# Patient Record
Sex: FEMALE | Race: BLACK OR AFRICAN AMERICAN | NOT HISPANIC OR LATINO | Employment: FULL TIME | URBAN - METROPOLITAN AREA
[De-identification: names, ages, dates, MRNs, and addresses within clinical notes are randomized per-mention and may not be internally consistent; named-entity substitution may affect disease eponyms.]

---

## 2019-02-21 ENCOUNTER — LAB (OUTPATIENT)
Dept: OTHER | Facility: HOSPITAL | Age: 39
End: 2019-02-21

## 2019-02-21 ENCOUNTER — CONSULT (OUTPATIENT)
Dept: ONCOLOGY | Facility: CLINIC | Age: 39
End: 2019-02-21

## 2019-02-21 ENCOUNTER — TELEPHONE (OUTPATIENT)
Dept: GENERAL RADIOLOGY | Facility: HOSPITAL | Age: 39
End: 2019-02-21

## 2019-02-21 VITALS — HEIGHT: 65 IN | BODY MASS INDEX: 31.39 KG/M2 | WEIGHT: 188.4 LBS

## 2019-02-21 DIAGNOSIS — R01.1 MURMUR, CARDIAC: ICD-10-CM

## 2019-02-21 DIAGNOSIS — D64.9 ANEMIA, UNSPECIFIED TYPE: Primary | ICD-10-CM

## 2019-02-21 DIAGNOSIS — R79.89 ELEVATED PLATELET COUNT: Primary | ICD-10-CM

## 2019-02-21 DIAGNOSIS — R06.89 DECREASED BREATH SOUNDS AT RIGHT LUNG BASE: ICD-10-CM

## 2019-02-21 LAB
BASOPHILS # BLD AUTO: 0.04 10*3/MM3 (ref 0–0.2)
BASOPHILS NFR BLD AUTO: 0.7 % (ref 0–1.5)
DEPRECATED RDW RBC AUTO: 43.6 FL (ref 37–54)
EOSINOPHIL # BLD AUTO: 0.12 10*3/MM3 (ref 0–0.4)
EOSINOPHIL NFR BLD AUTO: 2.1 % (ref 0.3–6.2)
ERYTHROCYTE [DISTWIDTH] IN BLOOD BY AUTOMATED COUNT: 13.9 % (ref 12.3–15.4)
FERRITIN SERPL-MCNC: 9.3 NG/ML (ref 13–150)
HCT VFR BLD AUTO: 33.7 % (ref 34–46.6)
HGB BLD-MCNC: 10.8 G/DL (ref 12–15.9)
IMM GRANULOCYTES # BLD AUTO: 0.03 10*3/MM3 (ref 0–0.05)
IMM GRANULOCYTES NFR BLD AUTO: 0.5 % (ref 0–0.5)
IRON 24H UR-MRATE: 21 MCG/DL (ref 37–145)
IRON SATN MFR SERPL: 5 % (ref 20–50)
LYMPHOCYTES # BLD AUTO: 2.54 10*3/MM3 (ref 0.7–3.1)
LYMPHOCYTES NFR BLD AUTO: 44.9 % (ref 19.6–45.3)
MCH RBC QN AUTO: 27.6 PG (ref 26.6–33)
MCHC RBC AUTO-ENTMCNC: 32 G/DL (ref 31.5–35.7)
MCV RBC AUTO: 86 FL (ref 79–97)
MONOCYTES # BLD AUTO: 0.46 10*3/MM3 (ref 0.1–0.9)
MONOCYTES NFR BLD AUTO: 8.1 % (ref 5–12)
NEUTROPHILS # BLD AUTO: 2.47 10*3/MM3 (ref 1.4–7)
NEUTROPHILS NFR BLD AUTO: 43.7 % (ref 42.7–76)
NRBC BLD AUTO-RTO: 0 /100 WBC (ref 0–0)
PLATELET # BLD AUTO: 449 10*3/MM3 (ref 140–450)
PMV BLD AUTO: 9.1 FL (ref 6–12)
RBC # BLD AUTO: 3.92 10*6/MM3 (ref 3.77–5.28)
TIBC SERPL-MCNC: 417 MCG/DL (ref 298–536)
TRANSFERRIN SERPL-MCNC: 280 MG/DL (ref 200–360)
WBC NRBC COR # BLD: 5.66 10*3/MM3 (ref 3.4–10.8)

## 2019-02-21 PROCEDURE — 83540 ASSAY OF IRON: CPT | Performed by: INTERNAL MEDICINE

## 2019-02-21 PROCEDURE — 85025 COMPLETE CBC W/AUTO DIFF WBC: CPT | Performed by: INTERNAL MEDICINE

## 2019-02-21 PROCEDURE — 84466 ASSAY OF TRANSFERRIN: CPT | Performed by: INTERNAL MEDICINE

## 2019-02-21 PROCEDURE — 99244 OFF/OP CNSLTJ NEW/EST MOD 40: CPT | Performed by: INTERNAL MEDICINE

## 2019-02-21 PROCEDURE — 82728 ASSAY OF FERRITIN: CPT | Performed by: INTERNAL MEDICINE

## 2019-02-21 PROCEDURE — 36415 COLL VENOUS BLD VENIPUNCTURE: CPT

## 2019-02-21 RX ORDER — ONDANSETRON 4 MG/1
4 TABLET, ORALLY DISINTEGRATING ORAL
COMMUNITY
Start: 2019-02-11

## 2019-02-21 RX ORDER — IBUPROFEN 800 MG/1
800 TABLET ORAL
COMMUNITY
Start: 2019-02-11 | End: 2020-02-11

## 2019-02-21 NOTE — PROGRESS NOTES
.     REASON FOR CONSULTATION:     Provide an opinion on any further workup or treatment                             REQUESTING PHYSICIAN: REY Fonseca  RECORDS OBTAINED:  Records of the patients history including those obtained from the referring provider were reviewed and summarized in detail.    HISTORY OF PRESENT ILLNESS:  The patient is a 38 y.o. year old female  who is here for follow-up with the above-mentioned history. In 2018 lab evaluation revealed significant thrombocytosis with a platelet count of 550,000. At that time, patient had recently had a miscarriage. She comes to the office today for further evaluation of this. She is currently in the process of attempting to get pregnant, yet again.     She has been historically anemic and this is the case yet again today with a hemoglobin of 10.8. Her platelets have actually normalized to high-normal at 449,000. Thrombocytosis is a reactive process to iron deficiency anemia. Patient is on a daily prenatal vitamin which she believes has iron in it.     She is feeling well. She denies any fatigue, chest pain, shortness of breath, or bleeding issues. No Pica noted.     She has no other concerns today.         Past Medical History:   Diagnosis Date   • Allergic rhinitis    • Dysmenorrhea    • Eczema    • Elevated platelet count 2019   • History of obesity    • History of prior pregnancies     x2, 1 miscarriage in 2018   • Hypercholesteremia    • Pruritus     After shower     Past Surgical History:   Procedure Laterality Date   •  SECTION  10/19/2008       HEMATOLOGIC/ONCOLOGIC HISTORY:  (History from previous dates can be found in the separate document.)    MEDICATIONS    Current Outpatient Medications:   •  ibuprofen (ADVIL,MOTRIN) 800 MG tablet, Take 800 mg by mouth., Disp: , Rfl:   •  ondansetron ODT (ZOFRAN-ODT) 4 MG disintegrating tablet, Take 4 mg by mouth., Disp: , Rfl:     ALLERGIES:     Allergies   Allergen Reactions   •  "Sulfa Antibiotics Other (See Comments)     uncertain         SOCIAL HISTORY:       Social History     Socioeconomic History   • Marital status:      Spouse name: Not on file   • Number of children: 1   • Years of education: Not on file   • Highest education level: Not on file   Social Needs   • Financial resource strain: Not on file   • Food insecurity - worry: Not on file   • Food insecurity - inability: Not on file   • Transportation needs - medical: Not on file   • Transportation needs - non-medical: Not on file   Occupational History     Employer: [employer]   Tobacco Use   • Smoking status: Never Smoker   • Smokeless tobacco: Never Used   Substance and Sexual Activity   • Alcohol use: No     Frequency: Never   • Drug use: No   • Sexual activity: Not on file   Other Topics Concern   • Not on file   Social History Narrative   • Not on file         FAMILY HISTORY:  Family History   Problem Relation Age of Onset   • Stroke Mother 50   • No Known Problems Sister    • No Known Problems Son    • Lupus Maternal Grandmother    • Early death Sister         House fire       REVIEW OF SYSTEMS:  Review of Systems  Negative except for as mentioned above          Vitals:    02/21/19 0808   Weight: 85.5 kg (188 lb 6.4 oz)   Height: 165 cm (64.96\")   PainSc: 0-No pain     Current Status 2/21/2019   ECOG score 0      PHYSICAL EXAM:      CONSTITUTIONAL:  Vital signs reviewed.  No distress, looks comfortable.  EYES:  Conjunctiva and lids unremarkable.  PERRLA  EARS,NOSE,MOUTH,THROAT:  Ears and nose appear unremarkable.  Lips, teeth, gums appear unremarkable.  RESPIRATORY:  Normal respiratory effort.  Lungs clear to auscultation bilaterally. Right posterior base diminished breath sounds   CARDIOVASCULAR:  Normal S1, S2.  No rubs or gallops. Grade 3/6 systolic murmur noted No significant lower extremity edema.  GASTROINTESTINAL: Abdomen appears unremarkable.  Nontender.  No hepatomegaly.  No splenomegaly.  LYMPHATIC:  No " cervical, supraclavicular, axillary lymphadenopathy.  MUSCULOSKELETAL:  Unremarkable gait and station.  Unremarkable digits/nails.  No cyanosis or clubbing.  SKIN:  Warm.  No rashes.  PSYCHIATRIC:  Normal judgment and insight.  Normal mood and affect.      RECENT LABS:        WBC   Date Value Ref Range Status   02/21/2019 5.66 3.40 - 10.80 10*3/mm3 Final     Hemoglobin   Date Value Ref Range Status   02/21/2019 10.8 (L) 12.0 - 15.9 g/dL Final     Platelets   Date Value Ref Range Status   02/21/2019 449 140 - 450 10*3/mm3 Final       Assessment/Plan   Anemia, unspecified type  - Ferritin  - Iron Profile    Decreased breath sounds at right lung base  - XR Chest PA & Lateral    Murmur, cardiac  - Adult Transthoracic Echo Complete W/ Cont if Necessary Per Protocol    1. Thrombocytosis: 6 month known history of elevation with associated anemia. Iron deficiency at this point until proven otherwise. We will check a ferritin and iron profile today. If negative, then we will pursue evaluation for a clonal process or other secondary causes (Inflammatory Lupus, etc)    2. Anemia: Please see above    3. Decreased breath sounds in right lung base: May be atelectasis but could be an effusion. We will obtain a chest x-ray for assessment. Patient is asymptomatic     4. Cardiac Murmur: Grade 3 systolic auscultated. We will request an echocardiogram     PLAN:  1. Iron studies and ferritin obtained today. I have asked patient to continue on prenatal vitamins. Additionally, she is to begin once daily ferrous sulfate  2. Chest x-ray to be performed within the next 2 weeks  3. Echocardiogram in next 2 weeks  4. I will plan to see patient in one month to review labs and imaging   5. She is to call with any new concerns prior to her next office visit

## 2019-02-21 NOTE — TELEPHONE ENCOUNTER
----- Message from Rosa Mcbride sent at 2/21/2019  9:08 AM EST -----  Regarding: PLEASE OVERBOOK DR LANGE  PLEASE OVERBOOK 2 UNITS DR LANGE ON MON 3-18-19 @ 12:40 PM.    THANKS!!

## 2019-03-06 ENCOUNTER — HOSPITAL ENCOUNTER (OUTPATIENT)
Dept: GENERAL RADIOLOGY | Facility: HOSPITAL | Age: 39
Discharge: HOME | End: 2019-03-06

## 2019-03-06 ENCOUNTER — HOSPITAL ENCOUNTER (OUTPATIENT)
Dept: CARDIOLOGY | Facility: HOSPITAL | Age: 39
Discharge: HOME | End: 2019-03-06
Admitting: INTERNAL MEDICINE

## 2019-03-06 VITALS
DIASTOLIC BLOOD PRESSURE: 62 MMHG | HEIGHT: 64 IN | BODY MASS INDEX: 32.1 KG/M2 | WEIGHT: 188 LBS | HEART RATE: 64 BPM | SYSTOLIC BLOOD PRESSURE: 132 MMHG

## 2019-03-06 DIAGNOSIS — R06.89 DECREASED BREATH SOUNDS AT RIGHT LUNG BASE: ICD-10-CM

## 2019-03-06 DIAGNOSIS — R01.1 MURMUR, CARDIAC: ICD-10-CM

## 2019-03-06 PROCEDURE — 71046 X-RAY EXAM CHEST 2 VIEWS: CPT

## 2019-03-06 PROCEDURE — 93306 TTE W/DOPPLER COMPLETE: CPT | Performed by: INTERNAL MEDICINE

## 2019-03-06 PROCEDURE — 93306 TTE W/DOPPLER COMPLETE: CPT

## 2019-03-07 LAB
ASCENDING AORTA: 2.7 CM
BH CV ECHO MEAS - ACS: 2 CM
BH CV ECHO MEAS - AO MAX PG (FULL): 2.8 MMHG
BH CV ECHO MEAS - AO MAX PG: 9.1 MMHG
BH CV ECHO MEAS - AO MEAN PG (FULL): 1.7 MMHG
BH CV ECHO MEAS - AO MEAN PG: 5.1 MMHG
BH CV ECHO MEAS - AO ROOT AREA: 7.2 CM^2
BH CV ECHO MEAS - AO ROOT DIAM: 3 CM
BH CV ECHO MEAS - AO V2 MAX: 150.7 CM/SEC
BH CV ECHO MEAS - AO V2 MEAN: 105.1 CM/SEC
BH CV ECHO MEAS - AO V2 VTI: 30.4 CM
BH CV ECHO MEAS - ASC AORTA: 2.7 CM
BH CV ECHO MEAS - AVA(I,A): 2.3 CM^2
BH CV ECHO MEAS - AVA(I,D): 2.3 CM^2
BH CV ECHO MEAS - AVA(V,A): 2.3 CM^2
BH CV ECHO MEAS - AVA(V,D): 2.3 CM^2
BH CV ECHO MEAS - EDV(CUBED): 67.5 ML
BH CV ECHO MEAS - EDV(MOD-SP2): 65 ML
BH CV ECHO MEAS - EDV(MOD-SP4): 60 ML
BH CV ECHO MEAS - EDV(TEICH): 73 ML
BH CV ECHO MEAS - EF(CUBED): 82.8 %
BH CV ECHO MEAS - EF(MOD-BP): 61 %
BH CV ECHO MEAS - EF(MOD-SP2): 58.5 %
BH CV ECHO MEAS - EF(MOD-SP4): 61.7 %
BH CV ECHO MEAS - EF(TEICH): 76.1 %
BH CV ECHO MEAS - ESV(CUBED): 11.6 ML
BH CV ECHO MEAS - ESV(MOD-SP2): 27 ML
BH CV ECHO MEAS - ESV(MOD-SP4): 23 ML
BH CV ECHO MEAS - ESV(TEICH): 17.5 ML
BH CV ECHO MEAS - IVS/LVPW: 1
BH CV ECHO MEAS - IVSD: 1 CM
BH CV ECHO MEAS - LAT PEAK E' VEL: 15 CM/SEC
BH CV ECHO MEAS - LV MASS(C)D: 126.9 GRAMS
BH CV ECHO MEAS - LV MAX PG: 6.3 MMHG
BH CV ECHO MEAS - LV MEAN PG: 3.4 MMHG
BH CV ECHO MEAS - LV V1 MAX: 125.1 CM/SEC
BH CV ECHO MEAS - LV V1 MEAN: 86 CM/SEC
BH CV ECHO MEAS - LV V1 VTI: 25.7 CM
BH CV ECHO MEAS - LVIDD: 4.1 CM
BH CV ECHO MEAS - LVIDS: 2.3 CM
BH CV ECHO MEAS - LVLD AP2: 7.8 CM
BH CV ECHO MEAS - LVLD AP4: 7.7 CM
BH CV ECHO MEAS - LVLS AP2: 6.4 CM
BH CV ECHO MEAS - LVLS AP4: 6.6 CM
BH CV ECHO MEAS - LVOT AREA (M): 2.8 CM^2
BH CV ECHO MEAS - LVOT AREA: 2.8 CM^2
BH CV ECHO MEAS - LVOT DIAM: 1.9 CM
BH CV ECHO MEAS - LVPWD: 0.96 CM
BH CV ECHO MEAS - MED PEAK E' VEL: 10 CM/SEC
BH CV ECHO MEAS - MV A DUR: 0.12 SEC
BH CV ECHO MEAS - MV A MAX VEL: 61.1 CM/SEC
BH CV ECHO MEAS - MV DEC SLOPE: 376.1 CM/SEC^2
BH CV ECHO MEAS - MV DEC TIME: 0.22 SEC
BH CV ECHO MEAS - MV E MAX VEL: 79.1 CM/SEC
BH CV ECHO MEAS - MV E/A: 1.3
BH CV ECHO MEAS - MV MAX PG: 3.2 MMHG
BH CV ECHO MEAS - MV MEAN PG: 1.4 MMHG
BH CV ECHO MEAS - MV P1/2T MAX VEL: 82 CM/SEC
BH CV ECHO MEAS - MV P1/2T: 63.8 MSEC
BH CV ECHO MEAS - MV V2 MAX: 89.6 CM/SEC
BH CV ECHO MEAS - MV V2 MEAN: 56.8 CM/SEC
BH CV ECHO MEAS - MV V2 VTI: 24 CM
BH CV ECHO MEAS - MVA P1/2T LCG: 2.7 CM^2
BH CV ECHO MEAS - MVA(P1/2T): 3.4 CM^2
BH CV ECHO MEAS - MVA(VTI): 3 CM^2
BH CV ECHO MEAS - PA MAX PG (FULL): 0.79 MMHG
BH CV ECHO MEAS - PA MAX PG: 4.5 MMHG
BH CV ECHO MEAS - PA V2 MAX: 105.9 CM/SEC
BH CV ECHO MEAS - PULM A REVS DUR: 0.1 SEC
BH CV ECHO MEAS - PULM A REVS VEL: 28.1 CM/SEC
BH CV ECHO MEAS - PULM DIAS VEL: 36.4 CM/SEC
BH CV ECHO MEAS - PULM S/D: 1.1
BH CV ECHO MEAS - PULM SYS VEL: 40.7 CM/SEC
BH CV ECHO MEAS - PVA(V,A): 2.5 CM^2
BH CV ECHO MEAS - PVA(V,D): 2.5 CM^2
BH CV ECHO MEAS - QP/QS: 0.83
BH CV ECHO MEAS - RV MAX PG: 3.7 MMHG
BH CV ECHO MEAS - RV MEAN PG: 2.3 MMHG
BH CV ECHO MEAS - RV V1 MAX: 96 CM/SEC
BH CV ECHO MEAS - RV V1 MEAN: 72.8 CM/SEC
BH CV ECHO MEAS - RV V1 VTI: 21.5 CM
BH CV ECHO MEAS - RVOT AREA: 2.8 CM^2
BH CV ECHO MEAS - RVOT DIAM: 1.9 CM
BH CV ECHO MEAS - SUP REN AO DIAM: 1.8 CM
BH CV ECHO MEAS - SV(AO): 219.5 ML
BH CV ECHO MEAS - SV(CUBED): 55.9 ML
BH CV ECHO MEAS - SV(LVOT): 71 ML
BH CV ECHO MEAS - SV(MOD-SP2): 38 ML
BH CV ECHO MEAS - SV(MOD-SP4): 37 ML
BH CV ECHO MEAS - SV(RVOT): 59.3 ML
BH CV ECHO MEAS - SV(TEICH): 55.5 ML
BH CV ECHO MEAS - TAPSE (>1.6): 1.8 CM2
BH CV ECHO MEAS - TR MAX VEL: 218.5 CM/SEC
BH CV ECHO MEASUREMENTS AVERAGE E/E' RATIO: 6.33
BH CV XLRA - RV BASE: 2.6 CM
BH CV XLRA - TDI S': 9 CM/SEC
LEFT ATRIUM VOLUME INDEX: 18 ML/M2
LV EF 2D ECHO EST: 61 %
MAXIMAL PREDICTED HEART RATE: 184 BPM
SINUS: 2.6 CM
STJ: 2.6 CM
STRESS TARGET HR: 156 BPM

## 2019-03-18 ENCOUNTER — OFFICE VISIT (OUTPATIENT)
Dept: ONCOLOGY | Facility: CLINIC | Age: 39
End: 2019-03-18

## 2019-03-18 ENCOUNTER — LAB (OUTPATIENT)
Dept: OTHER | Facility: HOSPITAL | Age: 39
End: 2019-03-18

## 2019-03-18 VITALS
RESPIRATION RATE: 16 BRPM | SYSTOLIC BLOOD PRESSURE: 128 MMHG | DIASTOLIC BLOOD PRESSURE: 86 MMHG | OXYGEN SATURATION: 100 % | HEART RATE: 83 BPM | BODY MASS INDEX: 32.39 KG/M2 | HEIGHT: 64 IN | TEMPERATURE: 97.9 F | WEIGHT: 189.7 LBS

## 2019-03-18 DIAGNOSIS — D64.9 ANEMIA, UNSPECIFIED TYPE: Primary | ICD-10-CM

## 2019-03-18 PROBLEM — R79.89 ELEVATED PLATELET COUNT: Status: ACTIVE | Noted: 2019-01-01

## 2019-03-18 LAB
BASOPHILS # BLD AUTO: 0.05 10*3/MM3 (ref 0–0.2)
BASOPHILS NFR BLD AUTO: 0.8 % (ref 0–1.5)
DEPRECATED RDW RBC AUTO: 43.2 FL (ref 37–54)
EOSINOPHIL # BLD AUTO: 0.17 10*3/MM3 (ref 0–0.4)
EOSINOPHIL NFR BLD AUTO: 2.9 % (ref 0.3–6.2)
ERYTHROCYTE [DISTWIDTH] IN BLOOD BY AUTOMATED COUNT: 14 % (ref 12.3–15.4)
HCT VFR BLD AUTO: 33.8 % (ref 34–46.6)
HGB BLD-MCNC: 10.7 G/DL (ref 12–15.9)
IMM GRANULOCYTES # BLD AUTO: 0.01 10*3/MM3 (ref 0–0.05)
IMM GRANULOCYTES NFR BLD AUTO: 0.2 % (ref 0–0.5)
LYMPHOCYTES # BLD AUTO: 2.94 10*3/MM3 (ref 0.7–3.1)
LYMPHOCYTES NFR BLD AUTO: 49.7 % (ref 19.6–45.3)
MCH RBC QN AUTO: 26.8 PG (ref 26.6–33)
MCHC RBC AUTO-ENTMCNC: 31.7 G/DL (ref 31.5–35.7)
MCV RBC AUTO: 84.7 FL (ref 79–97)
MONOCYTES # BLD AUTO: 0.67 10*3/MM3 (ref 0.1–0.9)
MONOCYTES NFR BLD AUTO: 11.3 % (ref 5–12)
NEUTROPHILS # BLD AUTO: 2.08 10*3/MM3 (ref 1.4–7)
NEUTROPHILS NFR BLD AUTO: 35.1 % (ref 42.7–76)
NRBC BLD AUTO-RTO: 0 /100 WBC (ref 0–0)
PLATELET # BLD AUTO: 550 10*3/MM3 (ref 140–450)
PMV BLD AUTO: 8.5 FL (ref 6–12)
RBC # BLD AUTO: 3.99 10*6/MM3 (ref 3.77–5.28)
WBC NRBC COR # BLD: 5.92 10*3/MM3 (ref 3.4–10.8)

## 2019-03-18 PROCEDURE — 36415 COLL VENOUS BLD VENIPUNCTURE: CPT

## 2019-03-18 PROCEDURE — 85025 COMPLETE CBC W/AUTO DIFF WBC: CPT | Performed by: INTERNAL MEDICINE

## 2019-03-18 PROCEDURE — 99214 OFFICE O/P EST MOD 30 MIN: CPT | Performed by: INTERNAL MEDICINE

## 2019-04-29 NOTE — PROGRESS NOTES
.     REASON FOR FOLLOW UP:   Anemia    Murmur   Decreased breath sounds                             REQUESTING PHYSICIAN: REY Fonseca  RECORDS OBTAINED:  Records of the patients history including those obtained from the referring provider were reviewed and summarized in detail.    HISTORY OF PRESENT ILLNESS:  The patient is a 38 y.o. year old female who is here for follow-up with the above-mentioned history. In 2018 lab evaluation revealed significant thrombocytosis with a platelet count of 550,000. At that time, patient had recently had a miscarriage. She comes to the office today for further evaluation of this. She is currently in the process of attempting to get pregnant, yet again.     She has been historically anemic and this is the case yet again today with a hemoglobin of 10.8. Her platelets have actually normalized to high-normal at 449,000. Thrombocytosis is a reactive process to iron deficiency anemia. Patient is on a daily prenatal vitamin which she believes has iron in it.     She is feeling well. She denies any fatigue, chest pain, shortness of breath, or bleeding issues. No Pica noted.     She has no other concerns today.       Past Medical History:   Diagnosis Date   • Allergic rhinitis    • Dysmenorrhea    • Eczema    • Elevated platelet count 2019   • History of obesity    • History of prior pregnancies     x2, 1 miscarriage in 2018   • Hypercholesteremia    • Pruritus     After shower     Past Surgical History:   Procedure Laterality Date   •  SECTION  10/19/2008       HEMATOLOGIC/ONCOLOGIC HISTORY:  (History from previous dates can be found in the separate document.)    MEDICATIONS    Current Outpatient Medications:   •  ibuprofen (ADVIL,MOTRIN) 800 MG tablet, Take 800 mg by mouth., Disp: , Rfl:   •  ondansetron ODT (ZOFRAN-ODT) 4 MG disintegrating tablet, Take 4 mg by mouth., Disp: , Rfl:     ALLERGIES:     Allergies   Allergen Reactions   • Sulfa Antibiotics  "Other (See Comments)     uncertain         SOCIAL HISTORY:       Social History     Socioeconomic History   • Marital status:      Spouse name: Not on file   • Number of children: 1   • Years of education: Not on file   • Highest education level: Not on file   Occupational History     Employer: [employer]   Tobacco Use   • Smoking status: Never Smoker   • Smokeless tobacco: Never Used   Substance and Sexual Activity   • Alcohol use: No     Frequency: Never   • Drug use: No         FAMILY HISTORY:  Family History   Problem Relation Age of Onset   • Stroke Mother 50   • No Known Problems Sister    • No Known Problems Son    • Lupus Maternal Grandmother    • Early death Sister         House fire       REVIEW OF SYSTEMS:  Review of Systems  Negative except for as mentioned above          Vitals:    03/18/19 1235   BP: 128/86   Pulse: 83   Resp: 16   Temp: 97.9 °F (36.6 °C)   TempSrc: Oral   SpO2: 100%   Weight: 86 kg (189 lb 11.2 oz)   Height: 162.6 cm (64.02\")   PainSc: 0-No pain     Current Status 3/18/2019   ECOG score 0      PHYSICAL EXAM:      CONSTITUTIONAL:  Vital signs reviewed.  No distress, looks comfortable.  EYES:  Conjunctiva and lids unremarkable.  PERRLA  EARS,NOSE,MOUTH,THROAT:  Ears and nose appear unremarkable.  Lips, teeth, gums appear unremarkable.  RESPIRATORY:  Normal respiratory effort.  Lungs clear to auscultation bilaterally. Right posterior base diminished breath sounds   CARDIOVASCULAR:  Normal S1, S2.  No rubs or gallops. Grade 3/6 systolic murmur noted No significant lower extremity edema.  GASTROINTESTINAL: Abdomen appears unremarkable.  Nontender.  No hepatomegaly.  No splenomegaly.  LYMPHATIC:  No cervical, supraclavicular, axillary lymphadenopathy.  MUSCULOSKELETAL:  Unremarkable gait and station.  Unremarkable digits/nails.  No cyanosis or clubbing.  SKIN:  Warm.  No rashes.  PSYCHIATRIC:  Normal judgment and insight.  Normal mood and affect.      RECENT LABS:        WBC   Date Value " Ref Range Status   03/18/2019 5.92 3.40 - 10.80 10*3/mm3 Final   02/21/2019 5.66 3.40 - 10.80 10*3/mm3 Final     Hemoglobin   Date Value Ref Range Status   03/18/2019 10.7 (L) 12.0 - 15.9 g/dL Final   02/21/2019 10.8 (L) 12.0 - 15.9 g/dL Final     Platelets   Date Value Ref Range Status   03/18/2019 550 (H) 140 - 450 10*3/mm3 Final   02/21/2019 449 140 - 450 10*3/mm3 Final       Assessment/Plan   Anemia, unspecified type    1. Thrombocytosis due to iron deficiency: 6 month known history of elevation with associated anemia. Iron deficiency at this point until proven otherwise.  Confirmed Ferritin and iron panels at 9.3 and 5%, respectively     2. Anemia: Please see above    3. Decreased breath sounds in right lung base: Xray was normal.  4. Cardiac Murmur: Grade 3 systolic auscultated.   PLAN:  1. Iron studies and ferritin obtained today. I have asked patient to continue on prenatal vitamins. Additionally, she is to begin once daily ferrous sulfate  2. Chest x-ray to be performed within the next 2 weeks  3. Echocardiogram in next 2 weeks  4. I will plan to see patient in one month to review labs and imaging   5. She is to call with any new concerns prior to her next office visit